# Patient Record
(demographics unavailable — no encounter records)

---

## 2025-06-11 NOTE — PHYSICAL EXAM
[Normal Heart Sounds] : normal heart sounds [Respiratory Effort] : normal respiratory effort [2+] : left 2+ [Alert] : alert [Calm] : calm [Abdomen Tenderness] : ~T ~M No abdominal tenderness [de-identified] : WN/WD, NAD [de-identified] : supple [de-identified] : NC/AT [FreeTextEntry1] : LUE without evidence of skin breakdown, discoloration or edema. Palpable thrill over upper arm, no neurologic deficits. [de-identified] : FROM throughout, strength 5/5x4 [de-identified] : grossly intact

## 2025-06-11 NOTE — REVIEW OF SYSTEMS
[Depression] : depression [Under Stress] : under stress [Negative] : Heme/Lymph [de-identified] : insomnia

## 2025-06-11 NOTE — HISTORY OF PRESENT ILLNESS
[FreeTextEntry1] : 83yoM, smoker with HTN, HLD T2DM, ESRD on HD via L chest permcath, s/p LUE AVF (placed in Navid) referred by Dr. Samano for AVF evaluation. Patient states that he used his AVF for 2 weeks, however it was infiltrated, and he is unable to use it. He was recently hospitalized at [facility], had LUE US and was told that the fistula is very tortuous. He denies left arm pain, edema, numbness, skin breakdown. He recently moved from [place].

## 2025-06-11 NOTE — ASSESSMENT
[FreeTextEntry1] : EKG NSR first degree AV block  A/P 1. pre operative cardiac examination 2. acute on chronic diastolic HF Hx as above  Recent admit [facility] liekly due to fluid overload due to excess sodium intake plus curtailed HD  Seen by Vascular today, AV fistula urgently indicated.  Prior stress test neg per pt report, echo report form [facility] last week w LVH, nl LVEF, no pericardial effusion. While CAD is likely present to some degree due to extensive CAD risk  factors, in view of low risk procedure and need for revision to facilitate HD, there are no cardiac contraindications to the planned procedure, for which pt presents as  intermediate cardiac risk for  low risk surgery. Continue higher dose  lasix for now  will repeat echo next visit   2. COPD 4. NEWMAN hx as  above resting 02 sat 92% as noted will refer Pulm for formal eval (component of NEWMAN attributable to COPD vs diastolic HF) re hoaseness, this is chronic after prolonged intubation, no dysphagia or pain, refer ENT  5. HTN  BP at goal continue current meds  6. DM, type 2, no insulin, CKD complication continue oral meds as   7. depression willr efer local psych to adjust meds

## 2025-06-11 NOTE — ASSESSMENT
[FreeTextEntry1] : EKG NSR first degree AV block  A/P 1. pre operative cardiac examination 2. acute on chronic diastolic HF Hx as above  Recent admit [facility]kly due to fluid overload due to excess sodium intake plus curtailed HD  Seen by Vascular today, AV fistula urgently indicated.  Prior stress test neg per pt report, echo report form [facility] last week w LVH, nl LVEF, no pericardial effusion. While CAD is likely present to some degree due to extensive CAD risk  factors, in view of low risk procedure and need for revision to facilitate HD, there are no cardiac contraindications to the planned procedure, for which pt presents as  intermediate cardiac risk for  low risk surgery. Continue higher dose  lasix for now  will repeat echo next visit   2. COPD 4. NEWMAN hx as  above resting 02 sat 92% as noted will refer Pulm for formal eval (component of NEWMAN attributable to COPD vs diastolic HF) re hoaseness, this is chronic after prolonged intubation, no dysphagia or pain, refer ENT  5. HTN  BP at goal continue current meds  6. DM, type 2, no insulin, CKD complication continue oral meds as   7. depression willr efer local psych to adjust meds

## 2025-06-11 NOTE — HISTORY OF PRESENT ILLNESS
[FreeTextEntry1] : 83 M  Cardiac risk factors/ diagnoses:   +HTN +Diabetes  Mellitus +Hyperlipidemia + former Tobacco Use Family history premature or other atherosclerotic heart disease    Progressive CKD, spring 204 developed DICK on CKD (contrast or medication induced) metabolic derangements in hospital w cardiac arrest, intubation w prolonged ICU course. Eventually discharged from rehab on HD, past weeks issues w dialysis access, reduced dialysis time, c/o crarmps, drinking pickle juice for relief. Admitted [facility] for SOB, found fluid overloaded, treated empirically for pneumonia, also diuresed. At home past week, no recurrent SOB or LE edema.  Denies chest pain, , PND palpitations or edema.

## 2025-06-11 NOTE — PHYSICAL EXAM
[Normal Heart Sounds] : normal heart sounds [Respiratory Effort] : normal respiratory effort [2+] : left 2+ [Alert] : alert [Calm] : calm [de-identified] : WN/WD, NAD [Abdomen Tenderness] : ~T ~M No abdominal tenderness [de-identified] : supple [de-identified] : NC/AT [FreeTextEntry1] : LUE without evidence of skin breakdown, discoloration or edema. Palpable thrill over upper arm, no neurologic deficits. [de-identified] : FROM throughout, strength 5/5x4 [de-identified] : grossly intact

## 2025-06-11 NOTE — HISTORY OF PRESENT ILLNESS
[FreeTextEntry1] : 83 M  Cardiac risk factors/ diagnoses:   +HTN +Diabetes  Mellitus +Hyperlipidemia + former Tobacco Use Family history premature or other atherosclerotic heart disease    Progressive CKD, spring 204 developed DICK on CKD (contrast or medication induced) metabolic derangements in hospital w cardiac arrest, intubation w prolonged ICU course. Eventually discharged from rehab on HD, past weeks issues w dialysis access, reduced dialysis time, c/o crarmps, drinking pickle juice for relief. Admitted [facility] for SOB, found fluid overloaded, treated empirically for pneumonia, also diuresed. At home past week, no recurrent SOB or LE edema.  Denies chest pain, , PND palpitations or edema.

## 2025-06-11 NOTE — REVIEW OF SYSTEMS
[Depression] : depression [Under Stress] : under stress [Negative] : Heme/Lymph [de-identified] : insomnia

## 2025-06-11 NOTE — PHYSICAL EXAM
[Well Developed] : well developed [Well Nourished] : well nourished [No Acute Distress] : no acute distress [Normal Conjunctiva] : normal conjunctiva [Normal Venous Pressure] : normal venous pressure [No Carotid Bruit] : no carotid bruit [Normal S1, S2] : normal S1, S2 [No Rub] : no rub [No Gallop] : no gallop [Murmur] : murmur [Clear Lung Fields] : clear lung fields [Good Air Entry] : good air entry [No Respiratory Distress] : no respiratory distress  [Soft] : abdomen soft [Non Tender] : non-tender [No Masses/organomegaly] : no masses/organomegaly [Normal Bowel Sounds] : normal bowel sounds [Normal Gait] : normal gait [No Edema] : no edema [No Cyanosis] : no cyanosis [No Clubbing] : no clubbing [No Varicosities] : no varicosities [No Rash] : no rash [Moves all extremities] : moves all extremities [No Focal Deficits] : no focal deficits [Normal Speech] : normal speech [Alert and Oriented] : alert and oriented [Normal memory] : normal memory [de-identified] : vs bruit from fistula  [de-identified] : dialysis catheeter L subclalvian  [de-identified] : AV fistula L brachial [de-identified] : brown hyperpigmented lesions on back

## 2025-06-11 NOTE — PHYSICAL EXAM
[Well Developed] : well developed [Well Nourished] : well nourished [No Acute Distress] : no acute distress [Normal Conjunctiva] : normal conjunctiva [Normal Venous Pressure] : normal venous pressure [No Carotid Bruit] : no carotid bruit [Normal S1, S2] : normal S1, S2 [No Rub] : no rub [No Gallop] : no gallop [Murmur] : murmur [Clear Lung Fields] : clear lung fields [Good Air Entry] : good air entry [No Respiratory Distress] : no respiratory distress  [Soft] : abdomen soft [Non Tender] : non-tender [No Masses/organomegaly] : no masses/organomegaly [Normal Bowel Sounds] : normal bowel sounds [Normal Gait] : normal gait [No Edema] : no edema [No Cyanosis] : no cyanosis [No Clubbing] : no clubbing [No Varicosities] : no varicosities [No Rash] : no rash [Moves all extremities] : moves all extremities [No Focal Deficits] : no focal deficits [Normal Speech] : normal speech [Alert and Oriented] : alert and oriented [Normal memory] : normal memory [de-identified] : vs bruit from fistula  [de-identified] : dialysis catheeter L subclalvian  [de-identified] : AV fistula L brachial [de-identified] : brown hyperpigmented lesions on back

## 2025-06-11 NOTE — HISTORY OF PRESENT ILLNESS
[FreeTextEntry1] : 83yoM, smoker with HTN, HLD T2DM, ESRD on HD via L chest permcath, s/p LUE AVF (placed in Navid) referred by Dr. Samano for AVF evaluation. Patient states that he used his AVF for 2 weeks, however it was infiltrated, and he is unable to use it. He was recently hospitalized at [facility], had LUE US and was told that the fistula is very tortuous. He denies left arm pain, edema, numbness, skin breakdown. He recently moved from [place].

## 2025-06-11 NOTE — END OF VISIT
----- Message from Lorraine Salas sent at 1/12/2017 10:16 AM CST -----  Contact: [phone]  Returning nurses call/thanks   [Time Spent: ___ minutes] : I have spent [unfilled] minutes of time on the encounter which excludes teaching and separately reported services.

## 2025-06-11 NOTE — PROCEDURE
[FreeTextEntry1] : LUE US was performed in the office that demonstrated patent AVF, mild increase in velocities noted in prox.-mid segment. Volume flow 431ml/min

## 2025-06-11 NOTE — ASSESSMENT
[FreeTextEntry1] : 83yoM, smoker with HTN, HLD T2DM, ESRD on HD via L chest permcath, s/p LUE AVF (placed in Navid) referred by Dr. Samano for AVF malfunction. He denies left arm pain, edema, numbness, skin breakdown.  On exam, LUE without evidence of skin breakdown, discoloration or edema. Palpable thrill over upper arm, no neurologic deficits. LUE US was performed in the office that demonstrated patent AVF, mild increase in velocities noted in prox.-mid segment. Volume flow 431ml/min. We discussed the findings and recommended to have AVF superficialization, the procedure, its RABs were discussed, all questions answered. Patient agrees and would like to be scheduled as soon as possible.  [Other: _____] : [unfilled]

## 2025-06-11 NOTE — ASSESSMENT
[FreeTextEntry1] : 83yoM, smoker with HTN, HLD T2DM, ESRD on HD via L chest permcath, s/p LUE AVF (placed in Navid) referred by Dr. aSmano for AVF malfunction. He denies left arm pain, edema, numbness, skin breakdown.  On exam, LUE without evidence of skin breakdown, discoloration or edema. Palpable thrill over upper arm, no neurologic deficits. LUE US was performed in the office that demonstrated patent AVF, mild increase in velocities noted in prox.-mid segment. Volume flow 431ml/min. We discussed the findings and recommended to have AVF superficialization, the procedure, its RABs were discussed, all questions answered. Patient agrees and would like to be scheduled as soon as possible.  [Other: _____] : [unfilled]

## 2025-06-11 NOTE — ADDENDUM
[FreeTextEntry1] : This note was written by Xenia WALLIS, acting as a scribe for Dr. Anastacio Robles.  I, Dr. Anastacio Robles, have read and attest that all the information, medical decision-making, and discharge instructions within are true and accurate.  I, Dr. Anastacio Robles, personally performed the evaluation and management (E/M) services for this new patient.  That E/M includes conducting the initial examination, assessing all conditions, and establishing the plan of care.  Today, my ACP, Xenia WALLIS, was here to observe my evaluation and management services for this patient to be followed going forward.

## 2025-06-19 NOTE — HISTORY OF PRESENT ILLNESS
[Former] : former [Never] : never [TextBox_4] : 83 year old male here for an initial visit for pneumonia, accompanied by his daughter and wife. Hx of COPD (dx 2023), HTN, Diabetes Mellitus, Hyperlipidemia, former tobacco use, progressive CKD on dialysis started 2024 (MWF).  Family history premature or other atherosclerotic heart disease  Pt. had PNA 3 weeks ago, sx was only SOB, admitted to [facility], dx with PNA, water in his lungs  Did fistula revision in left arm 10 days ago  From PCP, got a prescription for Trelegy, albuterol inhaler and nebulizer, atrovent, ipratropium nebulizer  O2 89% on RA NEWMAN but also has SOB at rest sometimes, pt. is always feeling SOB  Was hospitalized 1 month prior for the same issue in [place], was intubated in the past and has voice hoarseness ever since  Lives a sedentary lifestyle, unable to walk a lot without getting SOB     [TextBox_13] : 70 [YearQuit] : 2023 [TextBox_29] : Smoked cigarettes 0.5-1 pack a day

## 2025-06-19 NOTE — HISTORY OF PRESENT ILLNESS
[Former] : former [Never] : never [TextBox_4] : 83 year old male here for an initial visit for pneumonia, accompanied by his daughter and wife. Hx of COPD (dx 2023), HTN, Diabetes Mellitus, Hyperlipidemia, former tobacco use, progressive CKD on dialysis started 2024 (MWF).  Family history premature or other atherosclerotic heart disease  Pt. had PNA 3 weeks ago, sx was only SOB, admitted to [facility], dx with PNA, water in his lungs  Did fistula revision in left arm 10 days ago  From PCP, got a prescription for Trelegy, albuterol inhaler and nebulizer, atrovent, ipratropium nebulizer  O2 89% on RA NEWMAN but also has SOB at rest sometimes, pt. is always feeling SOB  Was hospitalized 1 month prior for the same issue in [place], was intubated in the past and has voice hoarseness ever since  Lives a sedentary lifestyle, unable to walk a lot without getting SOB     [TextBox_13] : 70 [YearQuit] : 2023 [TextBox_29] : Smoked cigarettes 0.5-1 pack a day

## 2025-06-19 NOTE — ASSESSMENT
[FreeTextEntry1] : 83 year old man, former > 50 pk yr smoker, with h/o DM, CKD that progressed to ESRD/HD 1 year ago, HFpEF and COPD who presents for evaluation and management of dyspnea and hypoxemia  History notable for at least 2 hospitalizations for "PNA" and "fluid in the lungs" in the past year. On exam, the patient is obese. His Resting SpO2 92% on RA, Exertional SpO2 87% on RA (<1 min of walking). Lungs are CTAB. CXR reveals clear right lung, ill defined opacities in the left lower lung field.  He takes Trelegy daily and albuterol+iptratropium nebs 4 times per day.   We need some baseline studies including - CBC, CMP, BNP, TSH  - PFT  - Chest CT - V/Q scan Rx recs: - Trial of Breztri in place of Trelegy - Albuterol PRN - No need for Ipratropium - Will order home O2   FU upon completion of above tests

## 2025-06-19 NOTE — PHYSICAL EXAM
[No Acute Distress] : no acute distress [Normal Oropharynx] : normal oropharynx [Normal Appearance] : normal appearance [No Neck Mass] : no neck mass [Normal Rate/Rhythm] : normal rate/rhythm [Normal S1, S2] : normal s1, s2 [No Murmurs] : no murmurs [No Abnormalities] : no abnormalities [Benign] : benign [Normal Gait] : normal gait [No Clubbing] : no clubbing [No Cyanosis] : no cyanosis [No Edema] : no edema [FROM] : FROM [Normal Color/ Pigmentation] : normal color/ pigmentation [No Focal Deficits] : no focal deficits [Oriented x3] : oriented x3 [No Resp Distress] : no resp distress [Clear to Auscultation Bilaterally] : clear to auscultation bilaterally [TextBox_68] : tachypneic

## 2025-06-24 NOTE — DATA REVIEWED
[de-identified] : b mhl with lf conductive component  type a tymps reviewed with pt, wife and daughter

## 2025-06-24 NOTE — HISTORY OF PRESENT ILLNESS
[FreeTextEntry1] : 84 y/o M with PMHX of HTN, HLD, DM2, COPD, and ERSD is here for an initial evaluation.   PT is here to establish kidney care.   His daughter shares that patient is a dialysis patient and initially was dialyzed via tunneled catheter.  attempt at establishing right forearm fistula was unsuccessful.  pt had placement of left forearm to arm fistula , which initially was inadequate to sustain HD.  it was recently revised by dr linder , who recommends that it will soon be ready for regular cannulation. cannulattion.. He receives 4 hrs long treatments 3x/week.   (He recently was admitted to [facility] ER to receive dialysis.  Completed an US, which found issues with his LT fistula.  Sent to [facility]/Dr. Simpson for f/u and completed another US. Stopped receiving needles for a week. ).  2 yrs ago in [place], PT had an episode that led him to be admitted to the ER. He completed an MRI to test for IVAN related issues. The scan detected no IVAN abnormalities.  Both PT and CARD believe that this was a CARD episode.   PT's kidney fx dropped below 10% since [place] last yr. He has started on dialysis since then.  His BP after dialysis is stable and the systolic ranges between 130-140s.   PT had pneumonia which led to another hospitalization.  Experienced mobility issues on Jul 1, 2024.   The first incident with fluid overload occurred on the day PT received dialysis.  Reports cramping before and after dialysis treatment. Has been drinking pickle juice, which alleviates this cramps which often occur on HD. Has been given Magnesium 400 mg and calcium citrate for his dialysis.   Has urinary output, normal quantity, normal urinary passage.  He gets up one time at night to urinate.  PT has ongoing cardiac evaluation with [facility]/Dr. Samano.   PULM/Dr. Thomas gave PT nebulizer.    Reports that his teeth were broken when he was intubated..  Is in the works of scheduling an appointment with ENT/Mariana.   Working on losing weight within the past few weeks.   Has persistent back pain after a work accident yrs ago.   Inconsistent and minimal sleep pattern.  Occasionally snores.  Does not take a sleeping pill.   Latest bloodwork showed a kidney fx of 7%. Has questions on how to raise this number.   Has a fistula on LT arm.  Old fistula on RT arm.   Reports constipation. Has yet to meet with GI. Was given Colace, which he has not taken.   Not UTD with skin checkup.   Denies: skin issues, ear/nose issues, neck issues, GI issues, arthritis/rheumatism, IVAN issues   MEDICATIONS: Amlodipine 10 mg QD Calcitriol 0.25 mcg MWF Fluoxetine 10 mg QD Losartan 50 mg QD Mybetriq ER 50 mg QD Rosuvastatin 10 mg QD Terazosin 5 mg QD Vit D3 25 mcg QD Aspirin 81 mg QD Escitalopram 10 mg AM Furosemide 40 mg MWFSu MVT PM Afua-Princess QD Senna-Lax 8.6 mg * 3 BID Tradjenta 5 mg QD Magnesium 400 mg  Calcium Citrate  ALLLERGIES: Amoxicillin (rash)  PMHX:   SURGICAL HX & HOSPITALIZATION: - Kidney stone removal (~50 yrs ago)    FAMILY HX: - Mother passed from 100 from cancer. - Father passed from a brain aneurysm. - No notable family hx of kidney disease. - 1 brother - 3 children, who are all well    SOCIAL HX: - Quit smoking 2 yrs ago.  - Lives in [place].  - Raised in [place]. - Was an . - No pets. - No alcohol hx.  - No recent international travel.

## 2025-06-24 NOTE — HISTORY OF PRESENT ILLNESS
[de-identified] : 82 yo m here with his wife and daughter. He hs b hl l worse than r progressive for years. He had ha 2 x in the past 5 yr he stops wearing them and has misplaced them on occasion. Has not worn hearing aids in about 2 years. He has been told he has salivary stones by Dr Dunbar and wanted to know what to do for these. He states his salivary glands swell when he gets dried out. Advised to hydrate. He was intubated 2 yrs ago for a month and since then hoarse. he sounds harse when he discusses this issue. He states he had an intubation injury. He quit smoking 2 yrs ago 1.5 ppd for 60 yrs before that. here with his wife and daughter.

## 2025-06-24 NOTE — ASSESSMENT
[FreeTextEntry1] : 1) salivary stones by history - given referral for three providers - advised to stay hydrated 2) b mhl- ct ordered -return with ct - likely otosclerosis but other pathology should be ruled out b hae 3) [facility] - recommended videostroboscopy and voice therapy. He disagreed. Refer laryngologist - staff asked to set up appt with Dr Rose rtc with ct

## 2025-06-24 NOTE — END OF VISIT
[TextEntry] : All medical record entries have been made by the scribe, ABRIL CASAS, at Dr. Raza Dunbar's direction and personally dictated by me on 6/24/2025. I have received the chart and agree that the record accurately reflects my personal performance of the history, physical exam, assessment, and plan, I have also personally directed, reviewed, and agreed with the chart.

## 2025-06-24 NOTE — ASSESSMENT
[FreeTextEntry1] : 1) salivary stones by history - given referral for three providers - advised to stay hydrated 2) b mhl- ct ordered -return with ct - likely otosclerosis but other pathology should be ruled out b hae 3) [facility] - recommended videostroboscopy and voice therapy. He disagreed. Refer laryngologist - staff asked to set up appt with Dr Rose rtc with ct

## 2025-06-24 NOTE — HISTORY OF PRESENT ILLNESS
[FreeTextEntry1] : 84 y/o M with PMHX of HTN, HLD, DM2, COPD, and ERSD is here for an initial evaluation.   PT is here to establish kidney care.   His daughter shares that patient is a dialysis patient and initially was dialyzed via tunneled catheter.  attempt at establishing right forearm fistula was unsuccessful.  pt had placement of left forearm to arm fistula , which initially was inadequate to sustain HD.  it was recently revised by dr linder , who recommends that it will soon be ready for regular cannulation. cannulattion.. He receives 4 hrs long treatments 3x/week.   (He recently was admitted to [facility] ER to receive dialysis.  Completed an US, which found issues with his LT fistula.  Sent to [facility]/Dr. Simpson for f/u and completed another US. Stopped receiving needles for a week. ).  2 yrs ago in [place], PT had an episode that led him to be admitted to the ER. He completed an MRI to test for IVAN related issues. The scan detected no IVAN abnormalities.  Both PT and CARD believe that this was a CARD episode.   PT's kidney fx dropped below 10% since [place] last yr. He has started on dialysis since then.  His BP after dialysis is stable and the systolic ranges between 130-140s.   PT had pneumonia which led to another hospitalization.  Experienced mobility issues on Jul 1, 2024.   The first incident with fluid overload occurred on the day PT received dialysis.  Reports cramping before and after dialysis treatment. Has been drinking pickle juice, which alleviates this cramps which often occur on HD. Has been given Magnesium 400 mg and calcium citrate for his dialysis.   Has urinary output, normal quantity, normal urinary passage.  He gets up one time at night to urinate.  PT has ongoing cardiac evaluation with [facility]/Dr. Samano.   PULM/Dr. Thomas gave PT nebulizer.    Reports that his teeth were broken when he was intubated..  Is in the works of scheduling an appointment with ENT/Mariana.   Working on losing weight within the past few weeks.   Has persistent back pain after a work accident yrs ago.   Inconsistent and minimal sleep pattern.  Occasionally snores.  Does not take a sleeping pill.   Latest bloodwork showed a kidney fx of 7%. Has questions on how to raise this number.   Has a fistula on LT arm.  Old fistula on RT arm.   Reports constipation. Has yet to meet with GI. Was given Colace, which he has not taken.   Not UTD with skin checkup.   Denies: skin issues, ear/nose issues, neck issues, GI issues, arthritis/rheumatism, IVAN issues   MEDICATIONS: Amlodipine 10 mg QD Calcitriol 0.25 mcg MWF Fluoxetine 10 mg QD Losartan 50 mg QD Mybetriq ER 50 mg QD Rosuvastatin 10 mg QD Terazosin 5 mg QD Vit D3 25 mcg QD Aspirin 81 mg QD Escitalopram 10 mg AM Furosemide 40 mg MWFSu MVT PM Afua-Princess QD Senna-Lax 8.6 mg * 3 BID Tradjenta 5 mg QD Magnesium 400 mg  Calcium Citrate  ALLLERGIES: Amoxicillin (rash)  PMHX:   SURGICAL HX & HOSPITALIZATION: - Kidney stone removal (~50 yrs ago)    FAMILY HX: - Mother passed from 100 from cancer. - Father passed from a brain aneurysm. - No notable family hx of kidney disease. - 1 brother - 3 children, who are all well    SOCIAL HX: - Quit smoking 2 yrs ago.  - Lives in [place].  - Raised in [place]. - Was an . - No pets. - No alcohol hx.  - No recent international travel.

## 2025-06-24 NOTE — PLAN
[TextEntry] : BP/HR taken in different positions (sitting, standing, and lying down) and d/w pt. Self-monitoring at home advised i.e. BP, FS, etc. Medications updated. Diet/healthy lifestyle counseling. New labs ordered.  During above visit, patient underwent detailed interval history, extensive pertinent re-examination, collection of necessary laboratory examinations, and referral for appropriate radiographic tests as necessary.   In addition, we reviewed the conceptual basis for the above evaluation and future plans with respect to the following topics which were addressed above.   Follow up in 1 month.

## 2025-06-24 NOTE — PROCEDURE
[Hoarseness] : hoarseness not clearly evaluated by indirect laryngoscopy [Topical Lidocaine] : topical lidocaine [Oxymetazoline HCl] : oxymetazoline HCl [Flexible Endoscope] : examined with the flexible endoscope [Serial Number: ___] : Serial Number: [unfilled] [Normal] : posterior cricoid area had healthy pink mucosa in the interarytenoid area and the esophageal inlet [de-identified] : done for hoarseness, could not see with mirror - appears to be mtd - false cords coming together with phonation

## 2025-06-24 NOTE — ASSESSMENT
[FreeTextEntry1] : * Pertinent to the above visit, the patient had exploration of interval history, detailed physical examination (generally including BP in all positions), ordering and obtaining necessary laboratory tests, and referring as necessary for radiographic testing, consultation, and other testing. This process was explained to patient in detail pertaining to the following considerations, which were reviewed and discussed in some fashion with the patient.  Collected bloodwork.  Referred PT to ENT/  for a hearing evaluation.  Advised PT to take OTC Colace 100 mg BID and Miralax with Senokot for his constipation.   Instructed PT to complete CT Chest w/o contrast. Instructed PT to complete NM study. Instructed PT to complete Abdomen US. Instructed PT to complete Kidney and Bladder US.  Requisitions were given in office before their departure.   F/U in 1 month.

## 2025-06-24 NOTE — HISTORY OF PRESENT ILLNESS
[de-identified] : 82 yo m here with his wife and daughter. He hs b hl l worse than r progressive for years. He had ha 2 x in the past 5 yr he stops wearing them and has misplaced them on occasion. Has not worn hearing aids in about 2 years. He has been told he has salivary stones by Dr Dunbar and wanted to know what to do for these. He states his salivary glands swell when he gets dried out. Advised to hydrate. He was intubated 2 yrs ago for a month and since then hoarse. he sounds harse when he discusses this issue. He states he had an intubation injury. He quit smoking 2 yrs ago 1.5 ppd for 60 yrs before that. here with his wife and daughter.

## 2025-06-24 NOTE — PROCEDURE
[Hoarseness] : hoarseness not clearly evaluated by indirect laryngoscopy [Topical Lidocaine] : topical lidocaine [Oxymetazoline HCl] : oxymetazoline HCl [Flexible Endoscope] : examined with the flexible endoscope [Serial Number: ___] : Serial Number: [unfilled] [Normal] : posterior cricoid area had healthy pink mucosa in the interarytenoid area and the esophageal inlet [de-identified] : done for hoarseness, could not see with mirror - appears to be mtd - false cords coming together with phonation

## 2025-06-24 NOTE — DATA REVIEWED
[de-identified] : b mhl with lf conductive component  type a tymps reviewed with pt, wife and daughter

## 2025-06-25 NOTE — PHYSICAL EXAM
[2+] : left 2+ [Alert] : alert [Calm] : calm [de-identified] : WN/WD, NAD [de-identified] : NC/AT [de-identified] : supple [FreeTextEntry1] : LUE with well approximated incision, palpable thrill over upper arm, no neurologic deficits. [de-identified] : FROM throughout, strength 5/5x4 [de-identified] : grossly intact

## 2025-06-25 NOTE — PROCEDURE
[FreeTextEntry1] : LUE US was performed in the office that demonstrated patent AVF, mild increase in velocities noted in prox.-mid segment. Volume flow 1368ml/min

## 2025-06-25 NOTE — PHYSICAL EXAM
[2+] : left 2+ [Alert] : alert [Calm] : calm [de-identified] : WN/WD, NAD [de-identified] : NC/AT [de-identified] : supple [FreeTextEntry1] : LUE with well approximated incision, palpable thrill over upper arm, no neurologic deficits. [de-identified] : FROM throughout, strength 5/5x4 [de-identified] : grossly intact

## 2025-06-25 NOTE — ADDENDUM
[FreeTextEntry1] :  This note was written by Xenia WALLIS, acting as a scribe for Dr. Anastacio Robles.  I, Dr. Anastacio Robles, have read and attest that all the information, medical decision-making, and discharge instructions within are true and accurate.  I, Dr. Anastacio Robles, personally performed the evaluation and management (E/M) services for this established patient who presents today with (an) existing condition(s).  That E/M includes conducting the examination, assessing all conditions, and (re)establishing/reinforcing a plan of care.  Today, my ACP, Xenia WALLIS, was here to observe my evaluation and management services for this condition to be followed going forward.

## 2025-06-25 NOTE — ASSESSMENT
[Other: _____] : [unfilled] [FreeTextEntry1] : 83yoM, smoker with HTN, HLD T2DM, ESRD on HD via L chest permcath, s/p LUE AVF (placed in Navid) that he was not able to use, now s/p AVF revision/superficialization on 6/12/25, returns for a post-op visit. He denies left arm pain, edema, numbness, skin breakdown.  On exam, LUE with well approximated incision, palpable thrill over upper arm, no neurologic deficits. LUE US was performed in the office that demonstrated patent AVF, mild increase in velocities noted in prox.-mid segment. Volume flow 1368ml/min We discussed the findings and recommended to start using the fistula in 2 weeks, if no issues he was recommended to call the office to schedule permcath removal.

## 2025-06-25 NOTE — REASON FOR VISIT
[Post Op: _________] : a [unfilled] post op visit [Family Member] : family member [Spouse] : spouse [FreeTextEntry2] : 6/12/25

## 2025-06-25 NOTE — HISTORY OF PRESENT ILLNESS
[FreeTextEntry1] : 83yoM, smoker with HTN, HLD T2DM, ESRD on HD via L chest permcath, s/p LUE AVF (placed in Navid) who was originally referred by Dr. Samano for AVF evaluation. Patient was seen in the office and after US of AVF was performed he was recommended to proceed with AVF revision/superficialization which was performed on 6/12/25. He returns today for a post-op visit. He denies left arm pain, edema, numbness, skin breakdown.

## 2025-07-10 NOTE — HISTORY OF PRESENT ILLNESS
[Former] : former [Never] : never [TextBox_4] : 83 year old male here for an initial visit for pneumonia, accompanied by his daughter and wife. Hx of COPD (dx 2023), HTN, Diabetes Mellitus, Hyperlipidemia, former tobacco use, progressive CKD on dialysis started 2024 (MWF).  Family history premature or other atherosclerotic heart disease  Pt. had PNA 3 weeks ago, sx was only SOB, admitted to [facility], dx with PNA, water in his lungs  Did fistula revision in left arm 10 days ago  From PCP, got a prescription for Trelegy, albuterol inhaler and nebulizer, atrovent, ipratropium nebulizer  O2 89% on RA NEWMAN but also has SOB at rest sometimes, pt. is always feeling SOB  Was hospitalized 1 month prior for the same issue in [place], was intubated in the past and has voice hoarseness ever since  Lives a sedentary lifestyle, unable to walk a lot without getting SOB   7/10/25 Not using inhalers for 1 week because he is "on strike", not using oxygen during exertion and only using it at night  Believes he had a lot of fluid in his chest so he felt like he was choking and the inhalers were making it worse  Also saw ENT, was told he had reflux despite not having symptoms Does not take the nebulizer at all, newman sot have as much phlegm and coughing  Like Trelegy better, did a trial of Breztri  [TextBox_13] : 70 [YearQuit] : 2023 [TextBox_29] : Smoked cigarettes 0.5-1 pack a day

## 2025-07-10 NOTE — HISTORY OF PRESENT ILLNESS
[Former] : former [Never] : never [TextBox_4] : 83 year old male here for an initial visit for pneumonia, accompanied by his daughter and wife. Hx of COPD (dx 2023), HTN, Diabetes Mellitus, Hyperlipidemia, former tobacco use, progressive CKD on dialysis started 2024 (MWF).  Family history premature or other atherosclerotic heart disease  Pt. had PNA 3 weeks ago, sx was only SOB, admitted to [facility], dx with PNA, water in his lungs  Did fistula revision in left arm 10 days ago  From PCP, got a prescription for Trelegy, albuterol inhaler and nebulizer, atrovent, ipratropium nebulizer  O2 89% on RA NEWMAN but also has SOB at rest sometimes, pt. is always feeling SOB  Was hospitalized 1 month prior for the same issue in [place], was intubated in the past and has voice hoarseness ever since  Lives a sedentary lifestyle, unable to walk a lot without getting SOB   7/10/25 Not using inhalers for 1 week because he is "on strike", not using oxygen during exertion and only using it at night  Believes he had a lot of fluid in his chest so he felt like he was choking and the inhalers were making it worse  Also saw ENT, was told he had reflux despite not having symptoms Does not take the nebulizer at all, newman sot have as much phlegm and coughing  Like Trelegy better, did a trial of Breztri  [TextBox_13] : 70 [YearQuit] : 2023 [TextBox_29] : Smoked cigarettes 0.5-1 pack a day

## 2025-07-10 NOTE — PHYSICAL EXAM
[No Acute Distress] : no acute distress [Normal Oropharynx] : normal oropharynx [Normal Appearance] : normal appearance [No Neck Mass] : no neck mass [Normal Rate/Rhythm] : normal rate/rhythm [Normal S1, S2] : normal s1, s2 [No Murmurs] : no murmurs [No Resp Distress] : no resp distress [Clear to Auscultation Bilaterally] : clear to auscultation bilaterally [No Abnormalities] : no abnormalities [Benign] : benign [Normal Gait] : normal gait [No Clubbing] : no clubbing [No Cyanosis] : no cyanosis [No Edema] : no edema [FROM] : FROM [Normal Color/ Pigmentation] : normal color/ pigmentation [No Focal Deficits] : no focal deficits [Oriented x3] : oriented x3 [TextBox_68] : tachypneic

## 2025-07-10 NOTE — ASSESSMENT
[FreeTextEntry1] : 83 year old man, former > 50 pk yr smoker, with h/o DM, CKD that progressed to ESRD/HD 1 year ago, HFpEF and COPD who presents for follow up of dyspnea and hypoxemia  History notable for at least 2 hospitalizations for "PNA" and "fluid in the lungs" in the past year. On exam, the patient is obese. His Resting SpO2 92% on RA, Exertional SpO2 87% on RA (<1 min of walking). Lungs are CTAB. CXR reveals clear right lung, ill defined opacities in the left lower lung field. Chest CT with emphysema V/Q scan neg for PE PFT with mild obstruction and moderately-severely reduced DLCO BNP is elevated ECHO not available  # Dyspnea and hypoxemia due to combination of mild COPD, CHF and ESRD -For COPD, recommend PRN bronchodilators -Supplemental O2 with exertion and sleep - no need for maintenance inhalers if they don't help him feel better   FU 3 mo

## 2025-07-10 NOTE — REASON FOR VISIT
[Follow-Up] : a follow-up visit [Pneumonia] : pneumonia [COPD] : COPD [Home] : at home, [unfilled] , at the time of the visit. [Medical Office: ([facility])___] : at the medical office located in  [Telehealth (audio & video)] : This visit was provided via telehealth using real-time 2-way audio visual technology. [Verbal consent obtained from patient] : the patient, [unfilled]

## 2025-07-10 NOTE — REASON FOR VISIT
[Follow-Up] : a follow-up visit [Pneumonia] : pneumonia [COPD] : COPD [Home] : at home, [unfilled] , at the time of the visit. [Medical Office: ([facility])___] : at the medical office located in  [Telehealth (audio & video)] : This visit was provided via telehealth using real-time 2-way audio visual technology. [Verbal consent obtained from patient] : the patient, [unfilled]

## 2025-07-23 NOTE — HISTORY OF PRESENT ILLNESS
[FreeTextEntry1] : 82 y/o M with PMHX of HTN, HLD, DM2, COPD, and ERSD is here for a follow up visit. Today is a return visit for Mr. Thompson VALDEZ ON date of birth January 31, 1942 who was seen earlier on June 24, 2025.  Mr. Breanne bucio is an 83-year-old man with past history of hypertension, hyperlipidemia, type 2 diabetes, COPD, and ESRD now treated with dialysis, then for a initial for an initial evaluation.  His medication list includes amlodipine 10 mg daily calcitriol 0.25 mg Monday Wednesday Friday, fluoxetine 10 mg daily, losartan 50 mg daily.  Myrbetriq NY RBE TR IQ ER 50 mg daily, rosuvastatin 10 mg daily.  Terazosin 5 mg daily, vitamin D 25 mcg (1000 units).  Aspirin 81 mg daily.  Escitalopram 10 mg daily.  Furosemide 40 mg Monday Wednesday Friday multivitamin Afua-Princess senna lax 3 twice daily as needed Tradjenta 5 mg daily.  Magnesium 400 mg.  And calcium citrate. Last seen: Jun 24, 2025      patient's past history also includes history of stone removal in the past, and access surgery.   His allergies include amoxicillin which resulted in a ""skin burn, but he tolerates Keflex well..  The family history is notable for longevity and there is no known history of renal disease in the family, and there is no history of PCKD.  Social history is referenced in the initial note but is noncontributory the review of systems is unrevealing except for COPD history of kidney stones, and forgetfulness.  He has constipation which is managed with his above-mentioned medication  PT is here to present health updates and establish care.  Completed all the imaging scans that he was instructed to complete from his last visit.   PT saw ENT/Dr. Mariposa Carbajal for evaluation of his low voice.  Saw CARD/Dr. Samano earlier today according to his family.  Waiting for Dr. Samano's cardiac clearance before proceeding with transplant.   Saw [facility]/Dr. Thomas on Jul 10, 2025.  Revealed that he does not have blood clots in his lungs Is not using any inhalers and nebulizers due to discomfort.  Informed that he is experiencing dyspnea and hypoxemia due to a combination of mild COPD, CHF, and ESRD.   Has not seen a URO for his prostate.   Reports receiving dialysis for 3 hr and 45 min on MWF.  Shares that the procedure is uncomfortable for him.   Has not seen DERM for birthmark check,   Wishes to go to a wedding on Aug 21, 2025.  Is wondering if he can shorten the duration of his dialysis session on this day.

## 2025-07-23 NOTE — END OF VISIT
[TextEntry] : All medical record entries have been made by the scribe, ABRIL CASAS, at Dr. Raza Dunbar's direction and personally dictated by me on 7/22/2025. I have received the chart and agree that the record accurately reflects my personal performance of the history, physical exam, assessment, and plan. I have also personally directed, reviewed, and agreed with the chart.

## 2025-07-23 NOTE — PLAN
[TextEntry] : BP/HR taken in different positions (sitting, standing, and lying down) and d/w pt. Self-monitoring at home advised i.e. BP, FS, etc. Medications updated. Diet/healthy lifestyle counseling. New labs ordered.  During above visit, patient underwent detailed interval history, extensive pertinent re-examination, collection of necessary laboratory examinations, and referral for appropriate radiographic tests as necessary.   In addition, we reviewed the conceptual basis for the above evaluation and future plans with respect to the following topics which were addressed above.   Follow up in

## 2025-07-23 NOTE — ASSESSMENT
[FreeTextEntry1] : * Pertinent to the above visit, the patient had exploration of interval history, detailed physical examination (generally including BP in all positions), ordering and obtaining necessary laboratory tests, and referring as necessary for radiographic testing, consultation, and other testing. This process was explained to patient in detail pertaining to the following considerations, which were reviewed and discussed in some fashion with the patient.  Reviewed PULM/Dr. Thomas's notes.   Discussed with PT his imaging results from Jun 24, 2025.  - Chest CT: patch of emphysema - NM Renal Scan: kidney function in either kidney is not good, but no obstruction detected - Lung Scan: no clotting to lungs  - US Abdomen: normal kidney size, but the lining is thin, consistent with his ESRD   Explained to PT the possibility of renal transplant.  He states that he is frustrated with dialysis.  If both PUL/Dr. Borrego and CARD/Dr. Samano deem PT as eligible for transplant, PT will be scheduled an appointment at [facility].   Reviewed laboratory results from Jun 24, 2025.  - Blood Count -> understandable  - Creatinine -> 6.68  - CO2 -> 20 (adequate) - Potassium -> 5.1 (decent) - Phosphorous -> slightly improved  - HGB A1C -> 5.7 (good)   RENAL FUNCTION A) Advanced Loss of Kidney Function When to Start Dialysis or Consider Transplant: - GFR =10-15 mL/min - Refractory: - Hyperkalemia - Acidosis - Fluid overload - Uremic symptoms (nausea, pericarditis, confusion)   B) Different Methods of Dialysis 1) Hemodialysis (HD): - Blood filtered through a machine. - 3x/week, ~4 hours/session. - Access: Arteriovenous fistula, graft, or catheter. 2) Peritoneal Dialysis (PD): - Uses peritoneal membrane for exchange. - Done at home, daily. - Types: Continuous ambulatory (CAPD) or automated (APD). 3) Continuous Renal Replacement Therapy (CRRT): - Used in ICU for unstable patients. - Continuous dialysis over 24 hrs.   C) Eligibility for Renal Transplant Criteria: - ESRD or approaching ESRD (GFR <20). - Good general health (no active infections or malignancies). - Psychosocial stability (medication adherence). - Adequate cardiovascular and pulmonary status. Contraindications: - Active cancer, severe infection, substance abuse, non-adherence history.

## 2025-07-23 NOTE — ASSESSMENT
[FreeTextEntry1] : * Pertinent to the above visit, the patient had exploration of interval history, detailed physical examination (generally including BP in all positions), ordering and obtaining necessary laboratory tests, and referring as necessary for radiographic testing, consultation, and other testing. This process was explained to patient in detail pertaining to the following considerations, which were reviewed and discussed in some fashion with the patient.  Reviewed PULM/Dr. Thomas's notes.   Discussed with PT his imaging results from Jun 24, 2025.  - Chest CT: patch of emphysema - NM Renal Scan: kidney function in either kidney is not good, but no obstruction detected - Lung Scan: no clotting to lungs  - US Abdomen: normal kidney size, but the lining is thin, consistent with his ESRD   Explained to PT the possibility of renal transplant.  He states that he is frustrated with dialysis.  If both PUL/Dr. Borrego and CARD/Dr. Samano deem PT as eligible for transplant, PT will be scheduled an appointment at [facility].   Reviewed laboratory results from Jun 24, 2025.  - Blood Count -> understandable  - Creatinine -> 6.68  - CO2 -> 20 (adequate) - Potassium -> 5.1 (decent) - Phosphorous -> slightly improved  - HGB A1C -> 5.7 (good)   RENAL FUNCTION A) Advanced Loss of Kidney Function When to Start Dialysis or Consider Transplant: - GFR =10-15 mL/min - Refractory: - Hyperkalemia - Acidosis - Fluid overload - Uremic symptoms (nausea, pericarditis, confusion)   B) Different Methods of Dialysis 1) Hemodialysis (HD): - Blood filtered through a machine. - 3x/week, ~4 hours/session. - Access: Arteriovenous fistula, graft, or catheter. 2) Peritoneal Dialysis (PD): - Uses peritoneal membrane for exchange. - Done at home, daily. - Types: Continuous ambulatory (CAPD) or automated (APD). 3) Continuous Renal Replacement Therapy (CRRT): - Used in ICU for unstable patients. - Continuous dialysis over 24 hrs.   C) Eligibility for Renal Transplant Criteria: - ESRD or approaching ESRD (GFR <20). - Good general health (no active infections or malignancies). - Psychosocial stability (medication adherence). - Adequate cardiovascular and pulmonary status. Contraindications: - Active cancer, severe infection, substance abuse, non-adherence history.

## 2025-07-23 NOTE — HISTORY OF PRESENT ILLNESS
[FreeTextEntry1] : 84 y/o M with PMHX of HTN, HLD, DM2, COPD, and ERSD is here for a follow up visit. Today is a return visit for Mr. Thompson VALDEZ ON date of birth January 31, 1942 who was seen earlier on June 24, 2025.  Mr. Breanne bucio is an 83-year-old man with past history of hypertension, hyperlipidemia, type 2 diabetes, COPD, and ESRD now treated with dialysis, then for a initial for an initial evaluation.  His medication list includes amlodipine 10 mg daily calcitriol 0.25 mg Monday Wednesday Friday, fluoxetine 10 mg daily, losartan 50 mg daily.  Myrbetriq NY RBE TR IQ ER 50 mg daily, rosuvastatin 10 mg daily.  Terazosin 5 mg daily, vitamin D 25 mcg (1000 units).  Aspirin 81 mg daily.  Escitalopram 10 mg daily.  Furosemide 40 mg Monday Wednesday Friday multivitamin Afua-Princess senna lax 3 twice daily as needed Tradjenta 5 mg daily.  Magnesium 400 mg.  And calcium citrate. Last seen: Jun 24, 2025      patient's past history also includes history of stone removal in the past, and access surgery.   His allergies include amoxicillin which resulted in a ""skin burn, but he tolerates Keflex well..  The family history is notable for longevity and there is no known history of renal disease in the family, and there is no history of PCKD.  Social history is referenced in the initial note but is noncontributory the review of systems is unrevealing except for COPD history of kidney stones, and forgetfulness.  He has constipation which is managed with his above-mentioned medication  PT is here to present health updates and establish care.  Completed all the imaging scans that he was instructed to complete from his last visit.   PT saw ENT/Dr. Mariposa Carbajal for evaluation of his low voice.  Saw CARD/Dr. Samano earlier today according to his family.  Waiting for Dr. Samano's cardiac clearance before proceeding with transplant.   Saw [facility]/Dr. Thomas on Jul 10, 2025.  Revealed that he does not have blood clots in his lungs Is not using any inhalers and nebulizers due to discomfort.  Informed that he is experiencing dyspnea and hypoxemia due to a combination of mild COPD, CHF, and ESRD.   Has not seen a URO for his prostate.   Reports receiving dialysis for 3 hr and 45 min on MWF.  Shares that the procedure is uncomfortable for him.   Has not seen DERM for birthmark check,   Wishes to go to a wedding on Aug 21, 2025.  Is wondering if he can shorten the duration of his dialysis session on this day.